# Patient Record
(demographics unavailable — no encounter records)

---

## 2025-02-21 NOTE — PHYSICAL EXAM
[Chaperone Present] : A chaperone was present in the examining room during all aspects of the physical examination [FreeTextEntry2] : Tracy [Abnormal] : Examination of extremities for edema and/or varicosities: Abnormal [Absent] : Adnexa(ae): Absent [Normal] : Recto-Vaginal Exam: Normal [de-identified] : Trace edema [de-identified] : Kishor scars intact [de-identified] : cuff intact. No blood or discharge seen.  [Fully active, able to carry on all pre-disease performance without restriction] : Status 0 - Fully active, able to carry on all pre-disease performance without restriction

## 2025-02-21 NOTE — DISCUSSION/SUMMARY
[Reviewed Clinical Lab Test(s)] : Results of clinical tests were reviewed. [Reviewed Radiology Report(s)] : Radiology reports were reviewed. [FreeTextEntry1] : She is COY.  -We have disc her status, the reassuring exam today, and recs for surv. IHC MMR results in CD.  -We again disc BHM and she'll f/u with PCP, who has ordered her imaging. -We have disc bone health, and her recent DEXA result.   -We have disc her panc cyst and the f/u study and GI rec. I strongly advised she connect with her GI, who she is overdue to see.    -Her instructions were disc. -All Q/A.  -She'll RTO in , sooner prn.  -Effort for the visit includes the note prep, review of prior material, interview, exam, further documentation, and coordination of care.

## 2025-02-21 NOTE — HISTORY OF PRESENT ILLNESS
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 Brandy Sanchez PCP - Dr. Ting Werner [facility] - Dr. Russell Vu now Dr. Sharlene Rocha - Dr. Lawrence [facility] - Dr. Abilio Castillo ([facility] - [phone])  Ms. Dia presents for surveillance. Denies VB, VD, or pain. She reports no GI or  concerns.   MRCP 12/2023 - pancreatic cyst stable - repeat in 1 year per GI. No interval f/u.   HCM: JUAN MANUEL: directed by PCP DEXA: Nov 2023 - nl. d/w pt.  Colonoscopy: 1/2022 - benign COVID-19 vaccine series completed